# Patient Record
Sex: MALE | Race: WHITE | NOT HISPANIC OR LATINO | URBAN - METROPOLITAN AREA
[De-identification: names, ages, dates, MRNs, and addresses within clinical notes are randomized per-mention and may not be internally consistent; named-entity substitution may affect disease eponyms.]

---

## 2021-03-19 ENCOUNTER — OUT OF OFFICE VISIT (OUTPATIENT)
Dept: URBAN - METROPOLITAN AREA MEDICAL CENTER 1 | Facility: MEDICAL CENTER | Age: 51
End: 2021-03-19
Payer: COMMERCIAL

## 2021-03-19 DIAGNOSIS — K92.1 BLACK STOOL: ICD-10-CM

## 2021-03-19 DIAGNOSIS — D64.89 ANEMIA DUE TO OTHER CAUSE: ICD-10-CM

## 2021-03-19 DIAGNOSIS — Z79.1 ENCOUNTER FOR LONG-TERM (CURRENT) USE OF NSAIDS: ICD-10-CM

## 2021-03-19 PROCEDURE — 99204 OFFICE O/P NEW MOD 45 MIN: CPT | Performed by: INTERNAL MEDICINE

## 2021-03-19 PROCEDURE — 43235 EGD DIAGNOSTIC BRUSH WASH: CPT | Performed by: INTERNAL MEDICINE

## 2023-05-22 ENCOUNTER — WEB ENCOUNTER (OUTPATIENT)
Dept: URBAN - NONMETROPOLITAN AREA CLINIC 4 | Facility: CLINIC | Age: 53
End: 2023-05-22

## 2023-05-25 ENCOUNTER — LAB OUTSIDE AN ENCOUNTER (OUTPATIENT)
Dept: URBAN - NONMETROPOLITAN AREA CLINIC 4 | Facility: CLINIC | Age: 53
End: 2023-05-25

## 2023-05-25 ENCOUNTER — OFFICE VISIT (OUTPATIENT)
Dept: URBAN - NONMETROPOLITAN AREA CLINIC 4 | Facility: CLINIC | Age: 53
End: 2023-05-25
Payer: COMMERCIAL

## 2023-05-25 ENCOUNTER — WEB ENCOUNTER (OUTPATIENT)
Dept: URBAN - NONMETROPOLITAN AREA CLINIC 4 | Facility: CLINIC | Age: 53
End: 2023-05-25

## 2023-05-25 VITALS
SYSTOLIC BLOOD PRESSURE: 115 MMHG | HEIGHT: 70 IN | TEMPERATURE: 97.9 F | BODY MASS INDEX: 27.8 KG/M2 | HEART RATE: 120 BPM | WEIGHT: 194.2 LBS | DIASTOLIC BLOOD PRESSURE: 61 MMHG

## 2023-05-25 DIAGNOSIS — C43.8 MALIGNANT MELANOMA OF OVERLAPPING SITES: ICD-10-CM

## 2023-05-25 DIAGNOSIS — R74.8 ELEVATED LIVER ENZYMES: ICD-10-CM

## 2023-05-25 DIAGNOSIS — R19.7 ACUTE DIARRHEA: ICD-10-CM

## 2023-05-25 PROBLEM — 443493003: Status: ACTIVE | Noted: 2023-05-25

## 2023-05-25 PROCEDURE — 99245 OFF/OP CONSLTJ NEW/EST HI 55: CPT | Performed by: INTERNAL MEDICINE

## 2023-05-25 PROCEDURE — 99205 OFFICE O/P NEW HI 60 MIN: CPT | Performed by: INTERNAL MEDICINE

## 2023-05-25 RX ORDER — IBUPROFEN 200 MG/1
1 TABLET WITH FOOD OR MILK AS NEEDED TABLET, FILM COATED ORAL THREE TIMES A DAY
Status: ACTIVE | COMMUNITY

## 2023-05-25 RX ORDER — INDOMETHACIN 50 MG/1
1 CAPSULE WITH FOOD OR MILK CAPSULE ORAL TWICE A DAY
Status: ACTIVE | COMMUNITY

## 2023-05-25 RX ORDER — PREDNISONE 10 MG/1
1 TABLET TABLET ORAL ONCE A DAY
Status: ACTIVE | COMMUNITY

## 2023-05-25 RX ORDER — PANTOPRAZOLE SODIUM 40 MG/1
1 TABLET TABLET, DELAYED RELEASE ORAL ONCE A DAY
Status: ACTIVE | COMMUNITY

## 2023-05-25 RX ORDER — ALLOPURINOL 300 MG/1
1 TABLET TABLET ORAL ONCE A DAY
Status: ACTIVE | COMMUNITY

## 2023-05-25 RX ORDER — BUPROPION HYDROCHLORIDE 100 MG/1
1 TABLET TABLET, FILM COATED ORAL TWICE A DAY
Status: ACTIVE | COMMUNITY

## 2023-05-25 RX ORDER — COLCHICINE 0.6 MG/1
1 TABLET TABLET, FILM COATED ORAL
Status: ACTIVE | COMMUNITY

## 2023-05-25 RX ORDER — SUCRALFATE 1 G/1
1 TABLET ON AN EMPTY STOMACH TABLET ORAL TWICE A DAY
Status: ACTIVE | COMMUNITY

## 2023-05-25 NOTE — HPI-TODAY'S VISIT:
AST (OL) 0 - 48 U/L 190 172 R 22 R ALT (OL) 13 - 61 U/L 114 128 R 25 R Raymon Walters (MRN [id number]) Printed by Azalea Trejo [[id number]] at 2023 10:52 AM Patient: Raymon Walters "[place]ryn" Exam Date: MRN #: [id number] : 1970 ACC #: [id number] Referring Provider: Danilo Lima Gender: Male Result Information Comprehensive metabolic panel Order: [id number] Raymon Walters (MRN [id number]) ALK PHOS (OL) 40 - 150 U/L 613 466 R 105 R PROTEIN TOTAL(OL) 6.0 - 8.3 g/dL 7.1 6.2 R 7.3 R ALBUMIN (OL) 3.4 - 5 g/dL 3.2 3.9 R 4.9 R BILIRUBIN TOTAL(OL) 0.20 - 1.00 mg/dL 1.20 0.6 R 0.5  54 yo male with malignant melanoma with mets, now with increased liver enzymes. Had liver biopsy  c/o chest tightness and refractory gerd as well Inconclusive liver biopsy which was negative. Pt reports that there was an abnormal area on the body scan.  Pt reports that after the liver bx had another bx of lymph node and since that time.  Pt reports that he has been experiencing hardness of the abdomen.  Pt reports that he has been having trouble with pressure since that time.   Pt reports that he has variation in bowel movements. Is getting 3 bm /week.  Pt reports that he is taking treatment for malignant melanoma.  Has been taking imodium.  Pt reports that he has not had stool studies.   Pt reports that   labs sodium 124 K 5.4 alk phos 455  alt 76 ast 188 tb 1.3  wbc 11.8 hgb 12.0plt 290 CT abd  multiple hepatic lesions  Inumerable subpectoral masses  Has immunotherapies x 1 with subsequent diarrhea.  not having melena Has tea colored urine Having bright red blood per rectum.  Has been taking prednisone Colonoscopy naive with no prior  has rash for check  point inhibitor on prednisone.

## 2023-05-25 NOTE — PHYSICAL EXAM GASTROINTESTINAL
Abdomen , soft, distended firm tender o guarding or rigidity , no masses palpable , normal bowel sounds , Liver ttp

## 2023-05-25 NOTE — PHYSICAL EXAM CONSTITUTIONAL:
well developed, well nourished , in no acute distress , ambulating without difficulty , normal communication ability, ill appearing

## 2023-06-08 LAB
CLOSTRIDIUM DIFFICILE TOXINB,QL REAL TIME PCR: NOT DETECTED
CULTURE: (no result)
GIARDIA AG, EIA, STOOL: (no result)
OVA AND PARASITES, CONC AND PERM SMEAR: (no result)

## 2023-07-10 ENCOUNTER — OFFICE VISIT (OUTPATIENT)
Dept: URBAN - NONMETROPOLITAN AREA CLINIC 4 | Facility: CLINIC | Age: 53
End: 2023-07-10
Payer: COMMERCIAL

## 2023-07-10 ENCOUNTER — DASHBOARD ENCOUNTERS (OUTPATIENT)
Age: 53
End: 2023-07-10

## 2023-07-10 ENCOUNTER — WEB ENCOUNTER (OUTPATIENT)
Dept: URBAN - NONMETROPOLITAN AREA CLINIC 4 | Facility: CLINIC | Age: 53
End: 2023-07-10

## 2023-07-10 VITALS
HEIGHT: 70 IN | SYSTOLIC BLOOD PRESSURE: 126 MMHG | DIASTOLIC BLOOD PRESSURE: 78 MMHG | TEMPERATURE: 98.1 F | BODY MASS INDEX: 28.58 KG/M2 | HEART RATE: 120 BPM | WEIGHT: 199.6 LBS

## 2023-07-10 DIAGNOSIS — R74.8 ELEVATED LIVER ENZYMES: ICD-10-CM

## 2023-07-10 DIAGNOSIS — C43.9 METASTATIC MELANOMA: ICD-10-CM

## 2023-07-10 DIAGNOSIS — R19.7 ACUTE DIARRHEA: ICD-10-CM

## 2023-07-10 PROCEDURE — 99213 OFFICE O/P EST LOW 20 MIN: CPT | Performed by: INTERNAL MEDICINE

## 2023-07-10 RX ORDER — BUPROPION HYDROCHLORIDE 100 MG/1
1 TABLET TABLET, FILM COATED ORAL TWICE A DAY
Status: ACTIVE | COMMUNITY

## 2023-07-10 RX ORDER — PANTOPRAZOLE SODIUM 40 MG/1
1 TABLET TABLET, DELAYED RELEASE ORAL ONCE A DAY
Status: ACTIVE | COMMUNITY

## 2023-07-10 RX ORDER — SUCRALFATE 1 G/1
1 TABLET ON AN EMPTY STOMACH TABLET ORAL TWICE A DAY
Status: ACTIVE | COMMUNITY

## 2023-07-10 RX ORDER — ALLOPURINOL 300 MG/1
1 TABLET TABLET ORAL ONCE A DAY
Status: ACTIVE | COMMUNITY

## 2023-07-10 RX ORDER — LISINOPRIL 20 MG/1
1 TABLET TABLET ORAL ONCE A DAY
Status: ACTIVE | COMMUNITY

## 2023-07-10 RX ORDER — ZOLPIDEM TARTRATE 10 MG/1
1 TABLET AT BEDTIME AS NEEDED TABLET, FILM COATED ORAL ONCE A DAY
Status: ACTIVE | COMMUNITY

## 2023-07-10 NOTE — HPI-TODAY'S VISIT:
AST (OL) 0 - 48 U/L 190 172 R 22 R ALT (OL) 13 - 61 U/L 114 128 R 25 R Raymon Walters (MRN [id number]) Printed by Azalea Trejo [[id number]] at 2023 10:52 AM Patient: Raymon Walters "[place]" Exam Date: MRN #: [id number] : 1970 ACC #: [id number] Referring Provider: Danilo Lima Gender: Male Result Information Comprehensive metabolic panel Order: [id number] Raymon Walters (MRN [id number]) ALK PHOS (OL) 40 - 150 U/L 613 466 R 105 R PROTEIN TOTAL(OL) 6.0 - 8.3 g/dL 7.1 6.2 R 7.3 R ALBUMIN (OL) 3.4 - 5 g/dL 3.2 3.9 R 4.9 R BILIRUBIN TOTAL(OL) 0.20 - 1.00 mg/dL 1.20 0.6 R 0.5  54 yo male with malignant melanoma with mets, now with increased liver enzymes. Had liver biopsy  c/o chest tightness and refractory gerd as well Inconclusive liver biopsy which was negative. Pt reports that there was an abnormal area on the body scan.  Pt reports that after the liver bx had another bx of lymph node and since that time.  Pt reports that he has been experiencing hardness of the abdomen.  Pt reports that he has been having trouble with pressure since that time.   Pt reports that he has variation in bowel movements. Is getting 3 bm /week.  Pt reports that he is taking treatment for malignant melanoma.  Has been taking imodium.  Pt reports that he has not had stool studies.   Pt reports that   labs sodium 124 K 5.4 alk phos 455  alt 76 ast 188 tb 1.3  wbc 11.8 hgb 12.0plt 290 CT abd  multiple hepatic lesions  Inumerable subpectoral masses  Has immunotherapies x 1 with subsequent diarrhea.  not having melena Has tea colored urine Having bright red blood per rectum.  Has been taking prednisone Colonoscopy naive with no prior  has rash for check  point inhibitor on prednisone. 7-10-23 Pt with chemotherapy induced neutropenia and additional radiation since last viist. Imaging wtih no visible capsular persistent abnormality. Pt with improvement in diarrhea. Counts have improved. Liver and spleen with melanoma mets. Has labs on Thurs.  Pt reports that he was placed on high dose prednisone and therapy to increase the wbc.